# Patient Record
Sex: FEMALE
[De-identification: names, ages, dates, MRNs, and addresses within clinical notes are randomized per-mention and may not be internally consistent; named-entity substitution may affect disease eponyms.]

---

## 2020-09-22 PROBLEM — Z00.00 ENCOUNTER FOR PREVENTIVE HEALTH EXAMINATION: Status: ACTIVE | Noted: 2020-09-22

## 2020-10-05 ENCOUNTER — APPOINTMENT (OUTPATIENT)
Dept: PLASTIC SURGERY | Facility: CLINIC | Age: 60
End: 2020-10-05
Payer: COMMERCIAL

## 2020-10-05 VITALS
SYSTOLIC BLOOD PRESSURE: 128 MMHG | DIASTOLIC BLOOD PRESSURE: 79 MMHG | TEMPERATURE: 98.2 F | OXYGEN SATURATION: 100 % | HEIGHT: 66 IN | HEART RATE: 75 BPM | BODY MASS INDEX: 27.32 KG/M2 | WEIGHT: 170 LBS

## 2020-10-05 DIAGNOSIS — Z86.39 PERSONAL HISTORY OF OTHER ENDOCRINE, NUTRITIONAL AND METABOLIC DISEASE: ICD-10-CM

## 2020-10-05 DIAGNOSIS — Z86.79 PERSONAL HISTORY OF OTHER DISEASES OF THE CIRCULATORY SYSTEM: ICD-10-CM

## 2020-10-05 DIAGNOSIS — Z80.3 FAMILY HISTORY OF MALIGNANT NEOPLASM OF BREAST: ICD-10-CM

## 2020-10-05 DIAGNOSIS — Z78.9 OTHER SPECIFIED HEALTH STATUS: ICD-10-CM

## 2020-10-05 DIAGNOSIS — Z90.11 ACQUIRED ABSENCE OF RIGHT BREAST AND NIPPLE: ICD-10-CM

## 2020-10-05 DIAGNOSIS — Z98.890 OTHER SPECIFIED POSTPROCEDURAL STATES: ICD-10-CM

## 2020-10-05 PROCEDURE — 99203 OFFICE O/P NEW LOW 30 MIN: CPT

## 2020-10-05 RX ORDER — ATORVASTATIN CALCIUM 80 MG/1
TABLET, FILM COATED ORAL
Refills: 0 | Status: ACTIVE | COMMUNITY

## 2020-10-05 RX ORDER — OLMESARTAN MEDOXOMIL 40 MG/1
TABLET, FILM COATED ORAL
Refills: 0 | Status: ACTIVE | COMMUNITY

## 2020-10-05 RX ORDER — HYDROCHLOROTHIAZIDE 12.5 MG/1
TABLET ORAL
Refills: 0 | Status: ACTIVE | COMMUNITY

## 2020-10-05 NOTE — REASON FOR VISIT
[Consultation] : a consultation visit [FreeTextEntry1] : acquired breast deformity, accessory axillary breast tissue

## 2020-10-05 NOTE — HISTORY OF PRESENT ILLNESS
[FreeTextEntry1] : 60 yo female presents for a consultation visit.  The patient has a history of benign mass excision, lumpectomy of her right breast.  This has resulted in a depression, scar, and asymmetry to the left breast.  The patient additionally reports sagging resulting in pain in her back, neck, and shoulders for which she has tried physical therapy with little relief.  The patient additionally has accessory axillary breast tissue that was previously excised, but she has a persistent deformity.  Her current bra size is 40 DD.  She states her last mammogram was in 2019, WNL, with [facility].  She is due for her mammogram now.  She has family history of breast cancer in her mother, age 80's.\par \par The patient has HTN and high cholesterol for which she takes olmesartan, HCTZ, and atorvastatin.  The patient has no allergies.  She has surgical history of right breast lumpectomy, axillary breast tissue excision, and surgery for varicose veins.  She has 2 children, both .  SHe does not smoke.  She works as a physical therapy.\par \par PCP:  Dr. Vinod Kidd in [place]

## 2020-10-05 NOTE — PHYSICAL EXAM
[NI] : Normal [de-identified] : The sternal notch to nipple on the left is 33 cm and 34 cm on the right, the nipple to IMF is 17 cm on the left and 16 cm on the right, the base width is 13 cm, there is grade 3 ptosis.  The right breast is larger, there is a 7 cm lumpectomy scar with contour depression of the right breast superior pole.  There is bilateral accessory axillary tissue.  There is a 6 cm scar of the right axilla and a 8 cm scar of the left axilla.